# Patient Record
Sex: FEMALE | Race: WHITE
[De-identification: names, ages, dates, MRNs, and addresses within clinical notes are randomized per-mention and may not be internally consistent; named-entity substitution may affect disease eponyms.]

---

## 2022-01-31 ENCOUNTER — HOSPITAL ENCOUNTER (OUTPATIENT)
Dept: HOSPITAL 76 - DI | Age: 22
Discharge: HOME | End: 2022-01-31
Attending: OBSTETRICS & GYNECOLOGY
Payer: COMMERCIAL

## 2022-01-31 DIAGNOSIS — Z36.89: ICD-10-CM

## 2022-01-31 DIAGNOSIS — Z86.2: ICD-10-CM

## 2022-01-31 DIAGNOSIS — Z3A.09: ICD-10-CM

## 2022-01-31 DIAGNOSIS — Z34.01: Primary | ICD-10-CM

## 2022-01-31 LAB
BASOPHILS NFR BLD AUTO: 0 10^3/UL (ref 0–0.1)
BASOPHILS NFR BLD AUTO: 0.3 %
CLARITY UR REFRACT.AUTO: CLEAR
EOSINOPHIL # BLD AUTO: 0.1 10^3/UL (ref 0–0.7)
EOSINOPHIL NFR BLD AUTO: 1.3 %
ERYTHROCYTE [DISTWIDTH] IN BLOOD BY AUTOMATED COUNT: 13.6 % (ref 12–15)
GLUCOSE UR QL STRIP.AUTO: NEGATIVE MG/DL
HCT VFR BLD AUTO: 35.1 % (ref 37–47)
HGB UR QL STRIP: 11.1 G/DL (ref 12–16)
IRON SATN MFR SERPL: 7 % (ref 20–50)
IRON SERPL-MCNC: 31 UG/DL (ref 28–170)
KETONES UR QL STRIP.AUTO: NEGATIVE MG/DL
LYMPHOCYTES # SPEC AUTO: 2.1 10^3/UL (ref 1.5–3.5)
LYMPHOCYTES NFR BLD AUTO: 19.7 %
MCH RBC QN AUTO: 25.5 PG (ref 27–31)
MCHC RBC AUTO-ENTMCNC: 31.6 G/DL (ref 32–36)
MCV RBC AUTO: 80.7 FL (ref 81–99)
MONOCYTES # BLD AUTO: 0.6 10^3/UL (ref 0–1)
MONOCYTES NFR BLD AUTO: 6 %
NEUTROPHILS # BLD AUTO: 7.5 10^3/UL (ref 1.5–6.6)
NEUTROPHILS # SNV AUTO: 10.4 X10^3/UL (ref 4.8–10.8)
NEUTROPHILS NFR BLD AUTO: 72.1 %
NITRITE UR QL STRIP.AUTO: NEGATIVE
NRBC # BLD AUTO: 0 /100WBC
NRBC # BLD AUTO: 0 X10^3/UL
PDW BLD AUTO: 8.9 FL (ref 7.9–10.8)
PH UR STRIP.AUTO: 6 PH (ref 5–7.5)
PLATELET # BLD: 212 10^3/UL (ref 130–450)
PROT UR STRIP.AUTO-MCNC: NEGATIVE MG/DL
RBC # UR STRIP.AUTO: NEGATIVE /UL
RBC # URNS HPF: (no result) /HPF (ref 0–5)
RBC MAR: 4.35 10^6/UL (ref 4.2–5.4)
SP GR UR STRIP.AUTO: <=1.005 (ref 1–1.03)
SQUAMOUS URNS QL MICRO: (no result)
TIBC SERPL-MCNC: 421 UG/DL (ref 250–450)
TRANSFERRIN SERPL-MCNC: 301 MG/DL (ref 192–382)
UROBILINOGEN UR QL STRIP.AUTO: (no result) E.U./DL
UROBILINOGEN UR STRIP.AUTO-MCNC: NEGATIVE MG/DL
WBC # UR MANUAL: (no result) /HPF (ref 0–5)

## 2022-01-31 PROCEDURE — 84466 ASSAY OF TRANSFERRIN: CPT

## 2022-01-31 PROCEDURE — 86787 VARICELLA-ZOSTER ANTIBODY: CPT

## 2022-01-31 PROCEDURE — 85025 COMPLETE CBC W/AUTO DIFF WBC: CPT

## 2022-01-31 PROCEDURE — 87086 URINE CULTURE/COLONY COUNT: CPT

## 2022-01-31 PROCEDURE — 82728 ASSAY OF FERRITIN: CPT

## 2022-01-31 PROCEDURE — 81001 URINALYSIS AUTO W/SCOPE: CPT

## 2022-01-31 PROCEDURE — 83540 ASSAY OF IRON: CPT

## 2022-01-31 PROCEDURE — 86900 BLOOD TYPING SEROLOGIC ABO: CPT

## 2022-01-31 PROCEDURE — 86850 RBC ANTIBODY SCREEN: CPT

## 2022-01-31 PROCEDURE — 86592 SYPHILIS TEST NON-TREP QUAL: CPT

## 2022-01-31 PROCEDURE — 86762 RUBELLA ANTIBODY: CPT

## 2022-01-31 PROCEDURE — 87340 HEPATITIS B SURFACE AG IA: CPT

## 2022-01-31 PROCEDURE — 86803 HEPATITIS C AB TEST: CPT

## 2022-01-31 PROCEDURE — 86901 BLOOD TYPING SEROLOGIC RH(D): CPT

## 2022-01-31 PROCEDURE — 87389 HIV-1 AG W/HIV-1&-2 AB AG IA: CPT

## 2022-01-31 PROCEDURE — 36415 COLL VENOUS BLD VENIPUNCTURE: CPT

## 2022-01-31 NOTE — ULTRASOUND REPORT
PROCEDURE:  OB First Trimester

 

INDICATIONS:  POSITIVE PREGNANCY, DATING

 

OUTSIDE/PRIOR DATING DATA:  

Last menstrual period (LMP): 11/22/2021.  

LMP-based estimated date of delivery (DAVE): 8/29/2022.  

First dating scan (date and location): 1/31/2022.  

Estimated date of delivery (DAVE) from first dating scan: 8/30/2022.  

The below data below was generated using the ultrasound DAVE of 8/30/2022

 

TECHNIQUE:  

Real-time scanning was performed of the fetus and maternal pelvic organs, with image documentation.  


 

COMPARISON:  None

 

FINDINGS:  A gestational sac with a fetal pole is identified.

 

Mean gestational sac diameter is 3.92 cm. 9 weeks 2 days

Crown-rump length is 3.05 cm. 9 weeks 6 days.

Heart rate: 178

 

Measurement variability in dating:  +/- 4 weeks by LMP, +/- 7 days by mean sac diameter (use before 6
 weeks gestation if crown-rump length not able to be measured), +/- 5 days by crown-rump length (6-12
 weeks gestation).  

 

Maternal organs:  Ovaries demonstrate a left corpus luteal cyst measuring 2.5 cm..  

 

IMPRESSION: Live intrauterine pregnancy measuring 9 weeks 6 days with no complication by ultrasound.

 

Reviewed by: Mark Cobb on 1/31/2022 4:19 PM PST

Approved by: Mark Cobb on 1/31/2022 4:19 PM PST

 

 

Station ID:  [id number]

## 2022-02-01 LAB
HEPATITIS B SURFACE ANTIGEN: (no result)
HEPATITIS C ANTIBODY: (no result)
HIV AG/AB 4TH GEN: (no result)
SIGNAL TO CUT-OFF: 0 (ref ?–1)

## 2022-02-21 ENCOUNTER — HOSPITAL ENCOUNTER (OUTPATIENT)
Dept: HOSPITAL 76 - LAB | Age: 22
Discharge: HOME | End: 2022-02-21
Attending: OBSTETRICS & GYNECOLOGY
Payer: COMMERCIAL

## 2022-02-21 DIAGNOSIS — O99.891: Primary | ICD-10-CM

## 2022-02-21 DIAGNOSIS — R71.8: ICD-10-CM

## 2022-02-21 DIAGNOSIS — Z86.2: ICD-10-CM

## 2022-02-21 PROCEDURE — 81599 UNLISTED MAAA: CPT

## 2022-02-21 PROCEDURE — 36415 COLL VENOUS BLD VENIPUNCTURE: CPT

## 2022-02-21 PROCEDURE — 85018 HEMOGLOBIN: CPT

## 2022-02-21 PROCEDURE — 83020 HEMOGLOBIN ELECTROPHORESIS: CPT

## 2022-02-21 PROCEDURE — 85041 AUTOMATED RBC COUNT: CPT

## 2022-02-21 PROCEDURE — 85014 HEMATOCRIT: CPT

## 2022-02-21 PROCEDURE — 82728 ASSAY OF FERRITIN: CPT

## 2022-03-01 ENCOUNTER — HOSPITAL ENCOUNTER (EMERGENCY)
Dept: HOSPITAL 76 - ED | Age: 22
Discharge: HOME | End: 2022-03-01
Payer: COMMERCIAL

## 2022-03-01 VITALS — DIASTOLIC BLOOD PRESSURE: 72 MMHG | SYSTOLIC BLOOD PRESSURE: 120 MMHG

## 2022-03-01 DIAGNOSIS — R51.9: ICD-10-CM

## 2022-03-01 DIAGNOSIS — Z3A.14: ICD-10-CM

## 2022-03-01 DIAGNOSIS — O26.892: Primary | ICD-10-CM

## 2022-03-01 LAB
ALBUMIN DIAFP-MCNC: 3.9 G/DL (ref 3.2–5.5)
ALBUMIN/GLOB SERPL: 1.1 {RATIO} (ref 1–2.2)
ALP SERPL-CCNC: 39 IU/L (ref 42–121)
ALT SERPL W P-5'-P-CCNC: 18 IU/L (ref 10–60)
ANION GAP SERPL CALCULATED.4IONS-SCNC: 10 MMOL/L (ref 6–13)
AST SERPL W P-5'-P-CCNC: 16 IU/L (ref 10–42)
BASOPHILS NFR BLD AUTO: 0 10^3/UL (ref 0–0.1)
BASOPHILS NFR BLD AUTO: 0.3 %
BILIRUB BLD-MCNC: 0.8 MG/DL (ref 0.2–1)
BUN SERPL-MCNC: 6 MG/DL (ref 6–20)
CALCIUM UR-MCNC: 9.2 MG/DL (ref 8.5–10.3)
CHLORIDE SERPL-SCNC: 104 MMOL/L (ref 101–111)
CLARITY UR REFRACT.AUTO: CLEAR
CO2 SERPL-SCNC: 21 MMOL/L (ref 21–32)
CREAT SERPLBLD-SCNC: 0.3 MG/DL (ref 0.4–1)
EOSINOPHIL # BLD AUTO: 0.1 10^3/UL (ref 0–0.7)
EOSINOPHIL NFR BLD AUTO: 0.9 %
ERYTHROCYTE [DISTWIDTH] IN BLOOD BY AUTOMATED COUNT: 13.7 % (ref 12–15)
GFRSERPLBLD MDRD-ARVRAT: 276 ML/MIN/{1.73_M2} (ref 89–?)
GLOBULIN SER-MCNC: 3.4 G/DL (ref 2.1–4.2)
GLUCOSE SERPL-MCNC: 78 MG/DL (ref 70–100)
GLUCOSE UR QL STRIP.AUTO: NEGATIVE MG/DL
HCT VFR BLD AUTO: 33.9 % (ref 37–47)
HGB UR QL STRIP: 10.9 G/DL (ref 12–16)
KETONES UR QL STRIP.AUTO: 15 MG/DL
LYMPHOCYTES # SPEC AUTO: 1.8 10^3/UL (ref 1.5–3.5)
LYMPHOCYTES NFR BLD AUTO: 16.5 %
MCH RBC QN AUTO: 26.1 PG (ref 27–31)
MCHC RBC AUTO-ENTMCNC: 32.2 G/DL (ref 32–36)
MCV RBC AUTO: 81.1 FL (ref 81–99)
MONOCYTES # BLD AUTO: 0.6 10^3/UL (ref 0–1)
MONOCYTES NFR BLD AUTO: 5.3 %
NEUTROPHILS # BLD AUTO: 8.2 10^3/UL (ref 1.5–6.6)
NEUTROPHILS # SNV AUTO: 10.7 X10^3/UL (ref 4.8–10.8)
NEUTROPHILS NFR BLD AUTO: 76.6 %
NITRITE UR QL STRIP.AUTO: NEGATIVE
NRBC # BLD AUTO: 0 /100WBC
NRBC # BLD AUTO: 0 X10^3/UL
PDW BLD AUTO: 9.2 FL (ref 7.9–10.8)
PH UR STRIP.AUTO: 6 PH (ref 5–7.5)
PLATELET # BLD: 200 10^3/UL (ref 130–450)
POTASSIUM SERPL-SCNC: 3.9 MMOL/L (ref 3.5–5)
PROT SPEC-MCNC: 7.3 G/DL (ref 6.7–8.2)
PROT UR STRIP.AUTO-MCNC: NEGATIVE MG/DL
RBC # UR STRIP.AUTO: NEGATIVE /UL
RBC MAR: 4.18 10^6/UL (ref 4.2–5.4)
SODIUM SERPLBLD-SCNC: 135 MMOL/L (ref 135–145)
SP GR UR STRIP.AUTO: 1.01 (ref 1–1.03)
UROBILINOGEN UR QL STRIP.AUTO: (no result) E.U./DL
UROBILINOGEN UR STRIP.AUTO-MCNC: NEGATIVE MG/DL

## 2022-03-01 PROCEDURE — 96374 THER/PROPH/DIAG INJ IV PUSH: CPT

## 2022-03-01 PROCEDURE — 87086 URINE CULTURE/COLONY COUNT: CPT

## 2022-03-01 PROCEDURE — 80053 COMPREHEN METABOLIC PANEL: CPT

## 2022-03-01 PROCEDURE — 99282 EMERGENCY DEPT VISIT SF MDM: CPT

## 2022-03-01 PROCEDURE — 81001 URINALYSIS AUTO W/SCOPE: CPT

## 2022-03-01 PROCEDURE — 36415 COLL VENOUS BLD VENIPUNCTURE: CPT

## 2022-03-01 PROCEDURE — 81003 URINALYSIS AUTO W/O SCOPE: CPT

## 2022-03-01 PROCEDURE — 99283 EMERGENCY DEPT VISIT LOW MDM: CPT

## 2022-03-01 PROCEDURE — 96375 TX/PRO/DX INJ NEW DRUG ADDON: CPT

## 2022-03-01 PROCEDURE — 85025 COMPLETE CBC W/AUTO DIFF WBC: CPT

## 2022-03-01 NOTE — ED PHYSICIAN DOCUMENTATION
History of Present Illness





- Stated complaint


Stated Complaint: HEADACHE, DIZZY





- Chief complaint


Chief Complaint: Neuro





- History obtained from


History obtained from: Patient





- History of Present Illness


Timing: How many days ago (3-4)


Pain level max: 6


Pain level now: 5





- Additonal information


Additional information: 





21-year-old female approximately 17 weeks pregnant presents to the emergency 

department with a generalized headache, ongoing for the past several days.  

Worse with light and noise.  Similar to prior headaches.  Took Tylenol without 

relief.  Has had nausea but no vomiting.  Felt lightheaded with standing.  

Decreased oral intake recently. No urinary symptoms.  No vaginal bleeding or 

discharge.





Review of Systems


Constitutional: denies: Fever, Chills


Respiratory: denies: Cough


GI: denies: Nausea, Vomiting, Diarrhea


Skin: denies: Rash


Musculoskeletal: denies: Neck pain, Back pain


Neurologic: reports: Headache (Gradual onset, holoacranial.)





PD PAST MEDICAL HISTORY





- Past Medical History


Past Medical History: No





- Past Surgical History


Past Surgical History: No





- Present Medications


Home Medications: 


                                Ambulatory Orders











 Medication  Instructions  Recorded  Confirmed


 


Prenatal No122/Iron/Folic Acid 1 each PO DAILY 03/01/22 03/01/22





[Prenatal Multi Tablet]   














- Allergies


Allergies/Adverse Reactions: 


                                    Allergies











Allergy/AdvReac Type Severity Reaction Status Date / Time


 


No Known Drug Allergies Allergy   Verified 03/01/22 11:32














- Social History


Does the pt smoke?: No


Smoking Status: Never smoker


Does the pt drink ETOH?: No


Does the pt have substance abuse?: No





- Immunizations


Immunizations are current?: No





PD ED PE NORMAL





- Vitals


Vital signs reviewed: Yes





- General


General: Alert and oriented X 3, No acute distress, Well developed/nourished





- HEENT


HEENT: PERRL, Moist mucous membranes





- Neck


Neck: Supple, no meningeal sign





- Cardiac


Cardiac: RRR, Strong equal pulses





- Respiratory


Respiratory: No respiratory distress, Clear bilaterally





- Abdomen


Abdomen: Soft, Non tender, Non distended





- Derm


Derm: Warm and dry





- Extremities


Extremities: No edema





- Neuro


Neuro: Alert and oriented X 3





- Psych


Psych: Normal mood, Normal affect





Results





- Vitals


Vitals: 


                               Vital Signs - 24 hr











  03/01/22 03/01/22





  11:29 13:32


 


Temperature 36.9 C 36.8 C


 


Heart Rate 80 80


 


Respiratory 16 16





Rate  


 


Blood Pressure 124/71 120/72


 


O2 Saturation 100 100








                                     Oxygen











O2 Source                      Room air

















- Labs


Labs: 


                                Laboratory Tests











  03/01/22 03/01/22 03/01/22





  12:24 12:24 12:48


 


WBC  10.7  


 


RBC  4.18 L  


 


Hgb  10.9 L  


 


Hct  33.9 L  


 


MCV  81.1  


 


MCH  26.1 L  


 


MCHC  32.2  


 


RDW  13.7  


 


Plt Count  200  


 


MPV  9.2  


 


Neut # (Auto)  8.2 H  


 


Lymph # (Auto)  1.8  


 


Mono # (Auto)  0.6  


 


Eos # (Auto)  0.1  


 


Baso # (Auto)  0.0  


 


Absolute Nucleated RBC  0.00  


 


Nucleated RBC %  0.0  


 


Sodium   135 


 


Potassium   3.9 


 


Chloride   104 


 


Carbon Dioxide   21 


 


Anion Gap   10.0 


 


BUN   6 


 


Creatinine   0.3 L 


 


Estimated GFR (MDRD)   276 


 


Glucose   78 


 


Calcium   9.2 


 


Total Bilirubin   0.8 


 


AST   16 


 


ALT   18 


 


Alkaline Phosphatase   39 L 


 


Total Protein   7.3 


 


Albumin   3.9 


 


Globulin   3.4 


 


Albumin/Globulin Ratio   1.1 


 


Urine Color    YELLOW


 


Urine Clarity    CLEAR


 


Urine pH    6.0


 


Ur Specific Gravity    1.015


 


Urine Protein    NEGATIVE


 


Urine Glucose (UA)    NEGATIVE


 


Urine Ketones    15 H


 


Urine Occult Blood    NEGATIVE


 


Urine Nitrite    NEGATIVE


 


Urine Bilirubin    NEGATIVE


 


Urine Urobilinogen    0.2 (NORMAL)


 


Ur Leukocyte Esterase    NEGATIVE


 


Ur Microscopic Review    NOT INDICATED


 


Urine Culture Comments    NOT INDICATED














PD MEDICAL DECISION MAKING





- ED course


Complexity details: reviewed results, re-evaluated patient, considered 

differential, d/w patient


ED course: 





Patient was given Toradol, Reglan and Benadryl.  Headache, nausea and lighthead

edness resolved.  No significant lab abnormalities.  Given IV fluids.  Normal 

fetal heart tones.  We will have the patient follow-up with OB for further care.

 Patient counseled regarding signs and symptoms for which I believe and urgent 

re-evaluation would be necessary. Patient with good understanding of and 

agreement to plan and is comfortable going home at this time





This document was made in part using voice recognition software. While efforts 

are made to proofread this document, sound alike and grammatical errors may 

occur.





Departure





- Departure


Disposition: 01 Home, Self Care


Clinical Impression: 


 Nausea





Headache


Qualifiers:


 Headache type: unspecified Headache chronicity pattern: acute headache 

Intractability: not intractable Qualified Code(s): R51.9 - Headache, unspecified





Pregnancy


Qualifiers:


 Weeks of gestation: 14 weeks Qualified Code(s): Z3A.14 - 14 weeks gestation of 

pregnancy





Condition: Good


Instructions:  ED Cephalgia Unspecified, ED Prenatal Care


Follow-Up: 


Julia Galvez MD [Primary Care Provider] - 


Comments: 


Please follow-up with your OB as needed for further care.  Return if you worsen.

 Go home and rest today.  You are not to drive today.


Discharge Date/Time: 03/01/22 14:02

## 2022-04-13 ENCOUNTER — HOSPITAL ENCOUNTER (OUTPATIENT)
Dept: HOSPITAL 76 - DI | Age: 22
Discharge: HOME | End: 2022-04-13
Attending: OBSTETRICS & GYNECOLOGY
Payer: COMMERCIAL

## 2022-04-13 DIAGNOSIS — Z36.89: ICD-10-CM

## 2022-04-13 DIAGNOSIS — Z3A.20: ICD-10-CM

## 2022-04-13 DIAGNOSIS — Z34.92: Primary | ICD-10-CM

## 2022-04-13 NOTE — ULTRASOUND REPORT
PROCEDURE:  OB Detailed Fetal Eval

 

INDICATIONS:  SUPERVISION OF PREGNANCY

 

OUTSIDE/PRIOR DATING DATA:  

Last menstrual period (LMP): November 22, 2021.  

LMP-based estimated date of delivery (DAVE): August 29, 2022.  

First dating scan (date ): January 31, 2022.  

Estimated date of delivery (DAVE) from first dating scan: August 30, 2022.  

 

TECHNIQUE: 

Real-time scanning was performed of the fetus, with image documentation and biometric measurements.  


 

 

COMPARISON:  January 31, 2022

 

FINDINGS:     

 

General:  A single living intrauterine gestation is present.  

Presentation:  Transverse

Placenta:  Placental position is anterior, without previa.  

Amniotic fluid index:  10.1 cm,  appropriate for gestational age.  

Fetal heart rate:  138 beats per minute.  

Maternal cervical canal:  5 cm long; normal length is 2.5 cm or more.  

 

Fetal biometrics:  

Biparietal diameter:  4.7 cm

Head circumference:  17.4 cm

Abdominal circumference:  15.6 cm

Femur length:  3.2 cm

Estimated gestational age from initial scan: 20 weeks, 1 day.  

Composite gestational age from present scan:  20 weeks, 2 days

Estimated fetal weight and percentile:  351 g; 60%

Measurement variability in biometric dating: +/- 10 days from 12-20 weeks gestation, +/- 2 weeks from
 20-30 weeks gestation, +/- 3 weeks at 30 weeks gestation or later.  

 

Anatomic survey:  

Neuro:  Ventricles are normal at less than 10 mm.  Cisterna magna is normal at 3-11 mm.  Cerebellum i
s normal in size and morphology.  

Nuchal skin fold:  Normal at less than 6 mm between 14 and 20 weeks gestational age.  

Face:  Nose and lips, facial profile are normal.  

Spine:  No evidence for spina bifida.  

Heart:  4-chambered heart is present, with normal ventricular outflow tracts.  

Diaphragm:  Diaphragm is intact.  

Stomach:  Left-sided stomach is present.  

Kidneys:  No fetal hydronephrosis.  Normal is less than 5 mm in 2nd trimester, less than 7 mm in 3rd 
trimester.  

Cord:  3 vessel cord has orthotopic insertion.  

Bladder:  Normal in size.  

Extremities:  All 4 extremities are visualized.  

 

IMPRESSION:  

Live single intrauterine gestation as detailed above.

 

Reviewed by: Chaim Love MD on 4/13/2022 11:25 AM PDT

Approved by: Chaim Love MD on 4/13/2022 11:25 AM PDT

 

 

Station ID:  [id number]

## 2022-06-13 ENCOUNTER — HOSPITAL ENCOUNTER (OUTPATIENT)
Dept: HOSPITAL 76 - LAB | Age: 22
Discharge: HOME | End: 2022-06-13
Attending: STUDENT IN AN ORGANIZED HEALTH CARE EDUCATION/TRAINING PROGRAM
Payer: COMMERCIAL

## 2022-06-13 DIAGNOSIS — Z36.89: ICD-10-CM

## 2022-06-13 DIAGNOSIS — D56.3: ICD-10-CM

## 2022-06-13 DIAGNOSIS — O99.013: Primary | ICD-10-CM

## 2022-06-13 PROCEDURE — 82728 ASSAY OF FERRITIN: CPT

## 2022-06-13 PROCEDURE — 36415 COLL VENOUS BLD VENIPUNCTURE: CPT

## 2022-06-22 ENCOUNTER — HOSPITAL ENCOUNTER (OUTPATIENT)
Dept: HOSPITAL 76 - WFO | Age: 22
Discharge: HOME | End: 2022-06-22
Attending: STUDENT IN AN ORGANIZED HEALTH CARE EDUCATION/TRAINING PROGRAM
Payer: COMMERCIAL

## 2022-06-22 VITALS — DIASTOLIC BLOOD PRESSURE: 79 MMHG | SYSTOLIC BLOOD PRESSURE: 128 MMHG

## 2022-06-22 DIAGNOSIS — O99.013: Primary | ICD-10-CM

## 2022-06-22 PROCEDURE — 96365 THER/PROPH/DIAG IV INF INIT: CPT

## 2022-07-26 ENCOUNTER — HOSPITAL ENCOUNTER (OUTPATIENT)
Dept: HOSPITAL 76 - LAB | Age: 22
Discharge: HOME | End: 2022-07-26
Payer: COMMERCIAL

## 2022-07-26 DIAGNOSIS — O99.013: Primary | ICD-10-CM

## 2022-07-26 LAB
ERYTHROCYTE [DISTWIDTH] IN BLOOD BY AUTOMATED COUNT: 15.9 % (ref 12–15)
HCT VFR BLD AUTO: 31.7 % (ref 37–47)
HGB UR QL STRIP: 9.7 G/DL (ref 12–16)
MCH RBC QN AUTO: 23.7 PG (ref 27–31)
MCHC RBC AUTO-ENTMCNC: 30.6 G/DL (ref 32–36)
MCV RBC AUTO: 77.3 FL (ref 81–99)
NEUTROPHILS # SNV AUTO: 8.9 X10^3/UL (ref 4.8–10.8)
PDW BLD AUTO: 9.2 FL (ref 7.9–10.8)
PLATELET # BLD: 265 10^3/UL (ref 130–450)
RBC MAR: 4.1 10^6/UL (ref 4.2–5.4)

## 2022-07-26 PROCEDURE — 36415 COLL VENOUS BLD VENIPUNCTURE: CPT

## 2022-07-26 PROCEDURE — 85027 COMPLETE CBC AUTOMATED: CPT

## 2022-07-26 PROCEDURE — 82728 ASSAY OF FERRITIN: CPT
